# Patient Record
Sex: MALE | Race: WHITE | NOT HISPANIC OR LATINO | Employment: OTHER | URBAN - METROPOLITAN AREA
[De-identification: names, ages, dates, MRNs, and addresses within clinical notes are randomized per-mention and may not be internally consistent; named-entity substitution may affect disease eponyms.]

---

## 2023-08-24 ENCOUNTER — HOSPITAL ENCOUNTER (EMERGENCY)
Facility: HOSPITAL | Age: 73
Discharge: HOME | End: 2023-08-24
Attending: EMERGENCY MEDICINE
Payer: MEDICARE

## 2023-08-24 VITALS
DIASTOLIC BLOOD PRESSURE: 79 MMHG | SYSTOLIC BLOOD PRESSURE: 141 MMHG | HEIGHT: 69 IN | HEART RATE: 84 BPM | TEMPERATURE: 98 F | RESPIRATION RATE: 21 BRPM | WEIGHT: 200 LBS | BODY MASS INDEX: 29.62 KG/M2 | OXYGEN SATURATION: 98 %

## 2023-08-24 DIAGNOSIS — U07.1 COVID-19: ICD-10-CM

## 2023-08-24 DIAGNOSIS — R06.02 SHORTNESS OF BREATH: Primary | ICD-10-CM

## 2023-08-24 LAB
ALBUMIN SERPL-MCNC: 3.9 G/DL (ref 3.4–4.8)
ALBUMIN/GLOB SERPL: 1.4 RATIO (ref 1.1–2)
ALP SERPL-CCNC: 73 UNIT/L (ref 40–150)
ALT SERPL-CCNC: 11 UNIT/L (ref 0–55)
AST SERPL-CCNC: 16 UNIT/L (ref 5–34)
BASOPHILS # BLD AUTO: 0.05 X10(3)/MCL
BASOPHILS NFR BLD AUTO: 0.5 %
BILIRUB SERPL-MCNC: 0.7 MG/DL
BNP BLD-MCNC: 24.3 PG/ML
BUN SERPL-MCNC: 14.8 MG/DL (ref 8.4–25.7)
CALCIUM SERPL-MCNC: 9.4 MG/DL (ref 8.8–10)
CHLORIDE SERPL-SCNC: 103 MMOL/L (ref 98–107)
CO2 SERPL-SCNC: 21 MMOL/L (ref 23–31)
CREAT SERPL-MCNC: 0.87 MG/DL (ref 0.73–1.18)
EOSINOPHIL # BLD AUTO: 0.11 X10(3)/MCL (ref 0–0.9)
EOSINOPHIL NFR BLD AUTO: 1 %
ERYTHROCYTE [DISTWIDTH] IN BLOOD BY AUTOMATED COUNT: 13.2 % (ref 11.5–17)
FLUAV AG UPPER RESP QL IA.RAPID: NOT DETECTED
FLUBV AG UPPER RESP QL IA.RAPID: NOT DETECTED
GFR SERPLBLD CREATININE-BSD FMLA CKD-EPI: >60 MLS/MIN/1.73/M2
GLOBULIN SER-MCNC: 2.7 GM/DL (ref 2.4–3.5)
GLUCOSE SERPL-MCNC: 162 MG/DL (ref 82–115)
HCT VFR BLD AUTO: 43.8 % (ref 42–52)
HGB BLD-MCNC: 15.1 G/DL (ref 14–18)
IMM GRANULOCYTES # BLD AUTO: 0.04 X10(3)/MCL (ref 0–0.04)
IMM GRANULOCYTES NFR BLD AUTO: 0.4 %
INR PPP: 1
LYMPHOCYTES # BLD AUTO: 1.45 X10(3)/MCL (ref 0.6–4.6)
LYMPHOCYTES NFR BLD AUTO: 13.7 %
MCH RBC QN AUTO: 30.3 PG (ref 27–31)
MCHC RBC AUTO-ENTMCNC: 34.5 G/DL (ref 33–36)
MCV RBC AUTO: 88 FL (ref 80–94)
MONOCYTES # BLD AUTO: 1.17 X10(3)/MCL (ref 0.1–1.3)
MONOCYTES NFR BLD AUTO: 11 %
NEUTROPHILS # BLD AUTO: 7.8 X10(3)/MCL (ref 2.1–9.2)
NEUTROPHILS NFR BLD AUTO: 73.4 %
NRBC BLD AUTO-RTO: 0 %
PLATELET # BLD AUTO: 160 X10(3)/MCL (ref 130–400)
PMV BLD AUTO: 8.4 FL (ref 7.4–10.4)
POTASSIUM SERPL-SCNC: 4.3 MMOL/L (ref 3.5–5.1)
PROT SERPL-MCNC: 6.6 GM/DL (ref 5.8–7.6)
PROTHROMBIN TIME: 12.9 SECONDS (ref 12.5–14.5)
RBC # BLD AUTO: 4.98 X10(6)/MCL (ref 4.7–6.1)
SARS-COV-2 RNA RESP QL NAA+PROBE: DETECTED
SODIUM SERPL-SCNC: 136 MMOL/L (ref 136–145)
TROPONIN I SERPL-MCNC: <0.01 NG/ML (ref 0–0.04)
WBC # SPEC AUTO: 10.62 X10(3)/MCL (ref 4.5–11.5)

## 2023-08-24 PROCEDURE — 80053 COMPREHEN METABOLIC PANEL: CPT | Performed by: PHYSICIAN ASSISTANT

## 2023-08-24 PROCEDURE — 85025 COMPLETE CBC W/AUTO DIFF WBC: CPT | Performed by: PHYSICIAN ASSISTANT

## 2023-08-24 PROCEDURE — 93005 ELECTROCARDIOGRAM TRACING: CPT

## 2023-08-24 PROCEDURE — 84484 ASSAY OF TROPONIN QUANT: CPT | Performed by: PHYSICIAN ASSISTANT

## 2023-08-24 PROCEDURE — 83880 ASSAY OF NATRIURETIC PEPTIDE: CPT | Performed by: PHYSICIAN ASSISTANT

## 2023-08-24 PROCEDURE — 99285 EMERGENCY DEPT VISIT HI MDM: CPT | Mod: 25

## 2023-08-24 PROCEDURE — 93010 ELECTROCARDIOGRAM REPORT: CPT | Mod: ,,, | Performed by: INTERNAL MEDICINE

## 2023-08-24 PROCEDURE — 85610 PROTHROMBIN TIME: CPT | Performed by: PHYSICIAN ASSISTANT

## 2023-08-24 PROCEDURE — 93010 EKG 12-LEAD: ICD-10-PCS | Mod: ,,, | Performed by: INTERNAL MEDICINE

## 2023-08-24 PROCEDURE — 25000003 PHARM REV CODE 250: Performed by: NURSE PRACTITIONER

## 2023-08-24 PROCEDURE — 0240U COVID/FLU A&B PCR: CPT | Performed by: PHYSICIAN ASSISTANT

## 2023-08-24 RX ORDER — CETIRIZINE HYDROCHLORIDE 10 MG/1
10 TABLET ORAL
Status: COMPLETED | OUTPATIENT
Start: 2023-08-24 | End: 2023-08-24

## 2023-08-24 RX ORDER — IBUPROFEN 600 MG/1
600 TABLET ORAL
Status: COMPLETED | OUTPATIENT
Start: 2023-08-24 | End: 2023-08-24

## 2023-08-24 RX ADMIN — IBUPROFEN 600 MG: 600 TABLET ORAL at 10:08

## 2023-08-24 RX ADMIN — CETIRIZINE HYDROCHLORIDE 10 MG: 10 TABLET, FILM COATED ORAL at 10:08

## 2023-08-25 NOTE — ED PROVIDER NOTES
Encounter Date: 8/24/2023       History     Chief Complaint   Patient presents with    Shortness of Breath     Patient presents with SOB that started today, patient reports laying down when symptoms started. Denies any fevers. Reports tightness in mid sternal area of the chest. No hx of cardiac disease. Hx of myasthenia gravis.      See MDM    The history is provided by the patient. No  was used.     Review of patient's allergies indicates:  No Known Allergies  No past medical history on file.  No past surgical history on file.  No family history on file.     Review of Systems   Constitutional:  Positive for fatigue and fever.   HENT:  Positive for congestion.    Respiratory:  Positive for cough.    Neurological:  Positive for headaches.   All other systems reviewed and are negative.      Physical Exam     Initial Vitals [08/24/23 1844]   BP Pulse Resp Temp SpO2   128/79 93 20 97.9 °F (36.6 °C) 97 %      MAP       --         Physical Exam    Nursing note and vitals reviewed.  Constitutional: He appears well-developed and well-nourished.   HENT:   Mouth/Throat: No oropharyngeal exudate, posterior oropharyngeal edema, posterior oropharyngeal erythema or tonsillar abscesses.   Eyes: Conjunctivae are normal.   Cardiovascular:  Normal rate and regular rhythm.           Murmur heard.  Pulmonary/Chest: Effort normal and breath sounds normal. No tachypnea. No respiratory distress.   Coughing noted   Abdominal: Abdomen is soft. He exhibits no distension. There is no abdominal tenderness.   Musculoskeletal:         General: Normal range of motion.     Neurological: He is alert and oriented to person, place, and time. He has normal strength.   Skin: Skin is warm and dry.   Psychiatric: He has a normal mood and affect.         ED Course   Procedures  Labs Reviewed   COMPREHENSIVE METABOLIC PANEL - Abnormal; Notable for the following components:       Result Value    Carbon Dioxide 21 (*)     Glucose Level  162 (*)     All other components within normal limits   COVID/FLU A&B PCR - Abnormal; Notable for the following components:    SARS-CoV-2 PCR Detected (*)     All other components within normal limits    Narrative:     The Xpert Xpress SARS-CoV-2/FLU/RSV plus is a rapid, multiplexed real-time PCR test intended for the simultaneous qualitative detection and differentiation of SARS-CoV-2, Influenza A, Influenza B, and respiratory syncytial virus (RSV) viral RNA in either nasopharyngeal swab or nasal swab specimens.         TROPONIN I - Normal   B-TYPE NATRIURETIC PEPTIDE - Normal   PROTIME-INR - Normal   CBC W/ AUTO DIFFERENTIAL    Narrative:     The following orders were created for panel order CBC auto differential.  Procedure                               Abnormality         Status                     ---------                               -----------         ------                     CBC with Differential[[id number]]                            Final result                 Please view results for these tests on the individual orders.   CBC WITH DIFFERENTIAL     EKG Readings: (Independently Interpreted)   Initial Reading: No STEMI. Rhythm: Normal Sinus Rhythm. Heart Rate: 91. Ectopy: No Ectopy. T Waves: Normal. Clinical Impression: Normal Sinus Rhythm Other Impression: Poor R-wave conduction across the precordial leads     ECG Results              EKG 12-lead (Final result)  Result time 08/24/23 20:59:16      Final result by Interface, Lab In [facility] (08/24/23 20:59:16)                   Narrative:    Test Reason : R06.02,    Vent. Rate : 091 BPM     Atrial Rate : 091 BPM     P-R Int : 150 ms          QRS Dur : 090 ms      QT Int : 328 ms       P-R-T Axes : 057 065 041 degrees     QTc Int : 403 ms    Normal sinus rhythm  Poor R wave progression across the precordial leads  Abnormal ECG  No previous ECGs available  Confirmed by Maikol Hall MD ([id number]) on 8/24/2023 8:59:09 PM    Referred By: [email or web address]   SELF            Confirmed By:Maikol Hall MD                                  Imaging Results              X-Ray Chest AP Portable (Final result)  Result time 08/24/23 19:23:34      Final result by Giancarlo Armendariz MD (08/24/23 19:23:34)                   Impression:      NO ACUTE CARDIOPULMONARY PROCESS IDENTIFIED.      Electronically signed by: Giancarlo Armendariz  Date:    08/24/2023  Time:    19:23               Narrative:    EXAMINATION:  XR CHEST AP PORTABLE    CLINICAL HISTORY:  shortness of breath;    TECHNIQUE:  One view    COMPARISON:  None available.    FINDINGS:  Cardiopericardial silhouette is within normal limits. Lungs are without dense focal or segmental consolidation, congestive process, pleural effusions or pneumothorax.                                       Medications   ibuprofen tablet 600 mg (600 mg Oral Given 8/24/23 2222)   cetirizine tablet 10 mg (10 mg Oral Given 8/24/23 2222)     Medical Decision Making  73-year-old male presents with cough, congestion, headache for the past day or so today he felt like he started to have higher fevers and when he started to get a fever and body aches he also started to feel short of breath which made her nervous.  He states his wife was just diagnosed in had COVID last week and he likely has that now but has not tested for it.  He denies being a smoker.  He does have a history of myasthenia gravis.  No chest pain.  Last took ibuprofen at 4:00 p.m.    Lab work unremarkable for any acute findings with a negative troponin negative BNP no leukocytosis his glucose was mildly elevated at 162.  His COVID is positive here today flu is negative.  Chest x-ray negative.  EKG no acute findings.  His oxygen has been anywhere from % on room air he actually feels better and less short of breath since taking the ibuprofen and feeling like the fever has gone down.  He did have a headache while here so Zyrtec and ibuprofen given for that.  We walked him in the room where he remained 98%  on room air did not get short-winded and was not tachycardic.  Will discharge home symptomatic treatment ER precautions given    Amount and/or Complexity of Data Reviewed  Independent Historian: spouse     Details: Wife states that he became short of breath today and then got anxious about it and they became concerned and so wanted to get checked out  Labs: ordered. Decision-making details documented in ED Course.     Details: Lab work unremarkable for any acute findings he has a mildly elevated glucose at 160 2.  Tropes negative BNP negative no leukocytosis.  COVID is positive flu is negative    Radiology: ordered. Decision-making details documented in ED Course.     Details: X-ray unremarkable for any acute findings  ECG/medicine tests: ordered. Decision-making details documented in ED Course.    Risk  OTC drugs.  Prescription drug management.                               Clinical Impression:   Final diagnoses:  [R06.02] Shortness of breath (Primary)  [U07.1] COVID-19        ED Disposition Condition    Discharge Stable          ED Prescriptions    None       Follow-up Information       Follow up With Specialties Details Why Contact Info    primary care provider  Call in 2 days As needed, If symptoms worsen              Dhara Stewart FNP  08/24/23 [id number]

## 2023-08-25 NOTE — DISCHARGE INSTRUCTIONS
Tylenol 1000mg in rotation with ibuprofen 600mg every 3 hours for fever control and body aches. Hydrate with lots of fluids. Claritin or zyrtec daily for congestion/pressure. Vitamin c. If symptoms change or worsen, return to er.